# Patient Record
(demographics unavailable — no encounter records)

---

## 2025-06-18 NOTE — HISTORY OF PRESENT ILLNESS
[FreeTextEntry1] : 73 year old female with PMHx of HTN, HLD, DM, renal cell carcinoma dx >10 yrs ago s/p renal mass resection (follows with onc Dr. Wiley- remains on treatment per him), who presented to [facility] ED 6/13/25 s/p fall with head strike on her nightstand after dizziness episode during the night when getting up to use the bathroom. CTA showed chronic venous thrombosis, pt was started on aspirin per stroke team, and CTH showed 1.8 x 0.8cm small enhancing dural lesion along R parasagittal falx. Admitted to medicine due to persistent dizziness. MRI without contrast performed showing redemonstrated small extra-axial right posterior parafalcine lesion. Neurosurgery consulted during admission and recommended outpatient follow- up.   Presents TODAY for evaluation.  She endorses post fall left periorbital swelling and forehead hematoma have been improving.  She remains on aspirin daily per discharge instructions.  Denies headaches, other dizziness episodes, other new/worsening focal neurological deficits.  Of note- she had left ear pain on flight coming home from [place] and then a few days later noticed blood her in her left ear.   Oncologist:  Jorge Wiley [address] [place] [id number]   [phone] [email or web address]

## 2025-06-18 NOTE — PHYSICAL EXAM
[General Appearance - Alert] : alert [General Appearance - In No Acute Distress] : in no acute distress [General Appearance - Well-Appearing] : healthy appearing [Oriented To Time, Place, And Person] : oriented to person, place, and time [Impaired Insight] : insight and judgment were intact [Affect] : the affect was normal [Memory Recent] : recent memory was not impaired [Abnormal Walk] : normal gait [Sclera] : the sclera and conjunctiva were normal [Neck Appearance] : the appearance of the neck was normal [] : no respiratory distress [Respiration, Rhythm And Depth] : normal respiratory rhythm and effort [Skin Color & Pigmentation] : normal skin color and pigmentation [FreeTextEntry5] : CN II-XII grossly intact  [FreeTextEntry1] : Steady gait with cane assistive device

## 2025-06-18 NOTE — DATA REVIEWED
[de-identified] :   ACC: [id number] EXAM: CT ANGIO BRAIN (W)AW IC ORDERED BY: NOAN SOOD  ACC: [id number] EXAM: CT ANGIO NECK (W)AW IC ORDERED BY: NONA SOOD  ACC: [id number] EXAM: CT BRAIN ORDERED BY: NONA SOOD  PROCEDURE DATE: 06/13/2025    INTERPRETATION: CLINICAL INFORMATION: Fall, dizziness  COMPARISON: None.  CONTRAST: IV Contrast: Isovue 370 80 cc administered 0 cc discarded  TECHNIQUE:  CT BRAIN: Serial axial images were obtained from the skull base to the vertex without the use of contrast. Images are reformatted in sagittal and coronal planes.  CTA NECK/HEAD: After the intravenous power injection of non-ionic contrast material, serial thin sections were obtained through the cervical and intracranial circulation on a multislice CT scanner. Axial, Coronal and Sagittal MIP reformatted images were obtained. 3D reconstruction was also performed.  FINDINGS:  CT BRAIN:  There is no acute intra-axial or extra-axial hemorrhage. There is no mass effect or shift of the midline. The basal cisterns are not effaced. There is mild cerebral and cerebellar volume loss with prominence of the ventricles, sulci, and cerebellar folia. There are mild chronic ischemic changes in the cerebral white matter. There is no CT evidence of an acute vascular territorial infarct. There are atherosclerotic calcifications of the intracranial carotid arteries.  There is a left frontal scalp hematoma. The skull base and bony calvarium are intact. The visualized paranasal sinuses and tympanic/mastoid cavities are clear apart from minimal bilateral ethmoid and maxillary sinus mucosal thickening.   CTA HEAD/NECK:  There is a three-vessel aortic arch. The common carotid arteries are patent from the origins to the bifurcations. There is no significant atherosclerotic disease at the carotid bifurcations. The cervical internal carotid arteries are patent without stenosis based on NASCET criteria. The cervical vertebral arteries are patent from the origins to the skull base. The left vertebral artery is dominant. There is no evidence of cervical carotid or vertebral artery dissection or injury.  The skull base and intracranial carotid arteries are patent without significant stenosis. The proximal MCAs and ACAs are patent without significant stenosis. The anterior communicating artery is visualized. Distal KATE and MCA branches are grossly symmetric.  The intradural right vertebral artery terminates as a PICA. The intradural left vertebral artery and basilar artery are patent without significant stenosis. Dominant bilateral posterior communicating arteries supplying the PCAs. The proximal PCAs are patent without significant stenosis. The superior cerebellar artery origins, bilateral AICAs, and bilateral PICAs are identified.  There is no evidence of an intracranial arterial aneurysm on CTA.  There is overall poor enhancement of the superior sagittal sinus with trickle areas of flow and mild irregularity of the left transverse sinus and sigmoid sinus which do demonstrate flow. . There is a small enhancing extradural lesion along the right parasagittal falx posteriorly measuring 1.8 x 0.8 cm likely representing a meningioma.  The regional soft tissues of the neck are otherwise unremarkable apart from absence of the left thyroid lobe. The lung apices are clear. There are degenerative changes of the spine.  CAROTID STENOSIS REFERENCE: Percent (%) stenosis is expressed in terms of NASCET Criteria. (NASCET = 100x1-(N/D)). N=greatest narrowing. D=normal distal diameter - MILD = <50% stenosis. - MODERATE = 50-69% stenosis. - SEVERE = 70-89% stenosis. - HAIRLINE/CRITICAL = 90-99% stenosis. - OCCLUDED = 100% stenosis.   IMPRESSION:  CT HEAD: No acute intracranial hemorrhage, mass effect, or acute osseous fracture. Left frontal scalp hematoma.  Mild chronic ischemic changes in the cerebral white matter. No CT evidence of an acute vascular territorial infarct.  CTA NECK: No evidence of significant stenosis or occlusion. No evidence of cervical carotid or vertebral artery dissection or injury.  CTA HEAD: No large vessel occlusion or high-grade stenosis. No evidence of an intracranial arterial aneurysm.  Overall poor enhancement of the superior sagittal sinus with trickle areas of flow and mild irregularity of the left transverse sinus and sigmoid sinus which do demonstrate flow. Constellation of findings is concerning for chronic thrombosis with recanalization.  Small enhancing extradural lesion along the right parasagittal falx posteriorly measuring 1.8 x 0.8 cm likely representing a small meningioma.    --- End of Report --- [de-identified] : ACC: [id number] EXAM: MR BRAIN ORDERED BY: STEVEN SONI  PROCEDURE DATE: 06/14/2025    INTERPRETATION: CLINICAL INDICATION: Dizziness. Evaluate for stroke.  TECHNIQUE: Limited multi-sequential MR imaging of the brain was performed without intravenous contrast. Note, DWI, ADC and T2-FLAIR axial sequences through the whole brain were obtained only.  COMPARISON: CT head and CTA head/neck 6/13/2025  FINDINGS:  No acute infarction, intracranial hemorrhage or parenchymal mass. Few scattered T2 FLAIR hyperintense punctate foci in the periventricular white matter nonspecific but most likely occurring on the basis of mild chronic microvascular ischemic changes.  The ventricles are normal without evidence of hydrocephalus. There are no extra-axial fluid collections. Redemonstrated 0.8 x 1.8 cm extra-axial lesion along the falx right posterior aspect with homogeneous mild hyperintense signal FLAIR sequence.  The visualized intraorbital contents are unremarkable. The imaged portions of the paranasal sinuses are clear. The mastoid air cells are clear. The visualized soft tissues and osseous structures appear unremarkable.   IMPRESSION:  No acute infarct.  Redemonstrated small extra-axial right posterior parafalcine lesion, which is incompletely characterized but may represent a meningioma. Contrast-enhanced images are suggested for further evaluation.   --- End of Report ---

## 2025-06-18 NOTE — DATA REVIEWED
[de-identified] :   ACC: [id number] EXAM: CT ANGIO BRAIN (W)AW IC ORDERED BY: NONA SOOD  ACC: [id number] EXAM: CT ANGIO NECK (W)AW IC ORDERED BY: NONA SOOD  ACC: [id number] EXAM: CT BRAIN ORDERED BY: NONA SOOD  PROCEDURE DATE: 06/13/2025    INTERPRETATION: CLINICAL INFORMATION: Fall, dizziness  COMPARISON: None.  CONTRAST: IV Contrast: Isovue 370 80 cc administered 0 cc discarded  TECHNIQUE:  CT BRAIN: Serial axial images were obtained from the skull base to the vertex without the use of contrast. Images are reformatted in sagittal and coronal planes.  CTA NECK/HEAD: After the intravenous power injection of non-ionic contrast material, serial thin sections were obtained through the cervical and intracranial circulation on a multislice CT scanner. Axial, Coronal and Sagittal MIP reformatted images were obtained. 3D reconstruction was also performed.  FINDINGS:  CT BRAIN:  There is no acute intra-axial or extra-axial hemorrhage. There is no mass effect or shift of the midline. The basal cisterns are not effaced. There is mild cerebral and cerebellar volume loss with prominence of the ventricles, sulci, and cerebellar folia. There are mild chronic ischemic changes in the cerebral white matter. There is no CT evidence of an acute vascular territorial infarct. There are atherosclerotic calcifications of the intracranial carotid arteries.  There is a left frontal scalp hematoma. The skull base and bony calvarium are intact. The visualized paranasal sinuses and tympanic/mastoid cavities are clear apart from minimal bilateral ethmoid and maxillary sinus mucosal thickening.   CTA HEAD/NECK:  There is a three-vessel aortic arch. The common carotid arteries are patent from the origins to the bifurcations. There is no significant atherosclerotic disease at the carotid bifurcations. The cervical internal carotid arteries are patent without stenosis based on NASCET criteria. The cervical vertebral arteries are patent from the origins to the skull base. The left vertebral artery is dominant. There is no evidence of cervical carotid or vertebral artery dissection or injury.  The skull base and intracranial carotid arteries are patent without significant stenosis. The proximal MCAs and ACAs are patent without significant stenosis. The anterior communicating artery is visualized. Distal KATE and MCA branches are grossly symmetric.  The intradural right vertebral artery terminates as a PICA. The intradural left vertebral artery and basilar artery are patent without significant stenosis. Dominant bilateral posterior communicating arteries supplying the PCAs. The proximal PCAs are patent without significant stenosis. The superior cerebellar artery origins, bilateral AICAs, and bilateral PICAs are identified.  There is no evidence of an intracranial arterial aneurysm on CTA.  There is overall poor enhancement of the superior sagittal sinus with trickle areas of flow and mild irregularity of the left transverse sinus and sigmoid sinus which do demonstrate flow. . There is a small enhancing extradural lesion along the right parasagittal falx posteriorly measuring 1.8 x 0.8 cm likely representing a meningioma.  The regional soft tissues of the neck are otherwise unremarkable apart from absence of the left thyroid lobe. The lung apices are clear. There are degenerative changes of the spine.  CAROTID STENOSIS REFERENCE: Percent (%) stenosis is expressed in terms of NASCET Criteria. (NASCET = 100x1-(N/D)). N=greatest narrowing. D=normal distal diameter - MILD = <50% stenosis. - MODERATE = 50-69% stenosis. - SEVERE = 70-89% stenosis. - HAIRLINE/CRITICAL = 90-99% stenosis. - OCCLUDED = 100% stenosis.   IMPRESSION:  CT HEAD: No acute intracranial hemorrhage, mass effect, or acute osseous fracture. Left frontal scalp hematoma.  Mild chronic ischemic changes in the cerebral white matter. No CT evidence of an acute vascular territorial infarct.  CTA NECK: No evidence of significant stenosis or occlusion. No evidence of cervical carotid or vertebral artery dissection or injury.  CTA HEAD: No large vessel occlusion or high-grade stenosis. No evidence of an intracranial arterial aneurysm.  Overall poor enhancement of the superior sagittal sinus with trickle areas of flow and mild irregularity of the left transverse sinus and sigmoid sinus which do demonstrate flow. Constellation of findings is concerning for chronic thrombosis with recanalization.  Small enhancing extradural lesion along the right parasagittal falx posteriorly measuring 1.8 x 0.8 cm likely representing a small meningioma.    --- End of Report --- [de-identified] : ACC: [id number] EXAM: MR BRAIN ORDERED BY: STEVEN SONI  PROCEDURE DATE: 06/14/2025    INTERPRETATION: CLINICAL INDICATION: Dizziness. Evaluate for stroke.  TECHNIQUE: Limited multi-sequential MR imaging of the brain was performed without intravenous contrast. Note, DWI, ADC and T2-FLAIR axial sequences through the whole brain were obtained only.  COMPARISON: CT head and CTA head/neck 6/13/2025  FINDINGS:  No acute infarction, intracranial hemorrhage or parenchymal mass. Few scattered T2 FLAIR hyperintense punctate foci in the periventricular white matter nonspecific but most likely occurring on the basis of mild chronic microvascular ischemic changes.  The ventricles are normal without evidence of hydrocephalus. There are no extra-axial fluid collections. Redemonstrated 0.8 x 1.8 cm extra-axial lesion along the falx right posterior aspect with homogeneous mild hyperintense signal FLAIR sequence.  The visualized intraorbital contents are unremarkable. The imaged portions of the paranasal sinuses are clear. The mastoid air cells are clear. The visualized soft tissues and osseous structures appear unremarkable.   IMPRESSION:  No acute infarct.  Redemonstrated small extra-axial right posterior parafalcine lesion, which is incompletely characterized but may represent a meningioma. Contrast-enhanced images are suggested for further evaluation.   --- End of Report ---

## 2025-06-18 NOTE — ASSESSMENT
[FreeTextEntry1] : This 73-year-old female presented with an incidental finding of a 1.8 x 0.8 cm enhancing dural-based lesion along the right parasagittal falx, discovered during recent hospitalization for dizziness and a fall.  She had a forehead hematoma from the fall.  CTA showed chronic venous thrombosis.  She is on aspirin for stroke prevention. She also has a history of a ruptured eardrum, hypertension, hypercholesterolemia, diabetes, and renal issues. Will obtain MRI w/wo contrast and refer to neuro/ent for evaluation of venous occlusion and eardrum/vertigo issues.   Dr. D'Amico independently reviewed all available images with patient.   PLAN: - MRI brain w/wo contrast  - Referral to stroke neurology NP Jenelle  - Referral to ENT Dr. Maurice Teixeira - RTC after MRI for review   Patient verbalizes understanding of today's discussion and next steps in treatment plan.   Today, my ACP, Lori Reid, was here to observe my evaluation and management services for this patient to be followed going forward.         Patient appreciates and agrees with current plan. This note was generated using medical dictation software. A reasonable effort has been made for proofreading its contents, but typos may still remain. If there are any questions or points of clarification needed, please notify my office.  A total of 45 minutes was spent reviewing the labs, imaging and physical examination of the patient. We discussed the diagnosis, and the plan. The patient's questions were answered. The patient demonstrated an excellent understanding of the plan.   .

## 2025-06-24 NOTE — PHYSICAL EXAM
[TextEntry] : General: AAO, no significant distress Psychiatric: affect pleasant and within normal limits Eyes: relatively symmetric, no obvious nystagmus Skin: no significant lesions on face Nose: no significant lesions; patent. Oral Cavity & Oropharynx: no significant deformity or lesions Neck/Lymphatics: no significant masses or abnormal cervical nodes palpated Respiratory: breathing comfortably; no significant distress Neurologic: cranial nerves II-XII grossly intact; EOMI Facial function: symmetric   Ear examination performed under binocular otologic microscope: Left Ear External: Pinna and periauricular area is normal. Canal: Ear canal skin is not inflamed or edematous. Left cerumen impaction cleaned under microscopy with instrumentation Tympanic Membrane: Intact and in good position.   Right Ear External: Pinna and periauricular area is normal. Canal: Ear canal skin is not inflamed or edematous. Right cerumen impaction cleaned under microscopy with instrumentation Tympanic Membrane: Intact and in good position.   Procedure: Left cerumen removal Pre-operative Diagnosis: Left cerumen impaction Post-operative Diagnosis: Left cerumen impaction Anesthesia: None Procedure Details: The patient was placed in the supine position. The left ear canal was determined to be impacted with cerumen. A curette and/or suction was used to remove the cerumen impaction under microscopy. Condition: Stable. Patient tolerated procedure well. Complications: None.   Procedure: Right cerumen removal Pre-operative Diagnosis: Right cerumen impaction Post-operative Diagnosis: Right cerumen impaction Anesthesia: None Procedure Details: The patient was placed in the supine position. The right ear canal was determined to be impacted with cerumen. A curette and/or suction was used to remove the cerumen impaction under microscopy. Condition: Stable. Patient tolerated procedure well. Complications: None.   Nasal Endoscopy:   Pre-operative Diagnosis: post-nasal drip Post-operative Diagnosis: post-nasal drip, bilateral inferior turbinate hypertrophy Anesthesia: Topical or None Procedure: Bilateral nasal endoscopy Procedure Details:   The patient was placed in the seated position sitting straight up. The telescope was passed along the left nasal floor to the nasopharynx. It was then passed into the region of the middle meatus, middle turbinate, and the sphenoethmoid region. An identical procedure was performed on the right side.   Findings: Interior nasal cavity: normal bilaterally Middle and superior meatus: normal bilaterally Sphenoethmoidal recess: normal bilaterally Mucosa: normal bilaterally Nasal septum: normal Discharge: none bilaterally Turbinates: bilateral inferior turbinate hypertrophy Adenoid: normal bilaterally Posterior choanae: normal bilaterally Eustachian tubes: normal bilaterally Mucous stranding: normal bilaterally Lesions: Not present   Comments: secretions in choana   Condition: Stable. Patient tolerated procedure well.

## 2025-06-24 NOTE — HISTORY OF PRESENT ILLNESS
[de-identified] : 73F who presents for a TM evaluation. She was on a flight last year and felt a lot of left ear pain due to the pressure. She has a chronic history of post-nasal drip. She also endorses vertigo when she lays flat. She will spin for 15-30 seconds. No otalgia, otorrhea, vertigo, tinnitus, acute hearing changes. No hx of ear surgery nor ear infections.

## 2025-06-24 NOTE — ASSESSMENT
[FreeTextEntry1] : Bilateral Cerumen Impaction - 1 chronic illness - Bilateral cerumen impaction removed under microscopy with instrumentation  Post-nasal Drip / Bilateral inferior turbinate hypertrophy - 2 chronic illnesses - Ipratropium Bromide 1 spray to each nostril bid - discussed the risks of Ipratropium bromide spray which include but are not limited to: epistaxis, dry nose, pharyngitis, nasal irritation/pain, headache, sore throat, congestion, sinusitis, rhinorrhea  Right BPPV The patient has benign paroxysmal positional vertigo of the posterior canal on the RIGHT -We reviewed the anatomy and etiology of the disease with the patient. -Epley maneuver was performed for the affected side in clinic today. -BPPV information sheet given to patient. -We have recommended that the patient sleep upright for 2 nights. - John Caro as needed - f/u as needed

## 2025-06-25 NOTE — ASSESSMENT
[FreeTextEntry1] : Deysi Black is a 73 year old female with PMH of HTN, HLD, T2DM c/b neuropathy, renal cell carcinoma s/p L partial nephrectomy and meningioma who presented to [facility] ED with dizziness and fall thought due to BPPV with incidental finding of CVST now presents for initial neurological consultation. Chronic CVST of unclear etiology, possibly due to previous head trauma vs. hypercoagulability from kidney CA.   Plan: -Continue Aspirin 81 mg daily for secondary stroke prevention  -Repeat MRV in 6 months for CVST surveillance if stable, then repeat yearly -Hematology referral to r/o underlying hypercoagulable disorder  -Referral to [place] for vertigo management if symptoms continue  -Continue aggressive vascular risk factor control with PCP/Cardiology -Encouraged healthy lifestyle habits including regular exercise, goal of 8 hours of sleep, adequate hydration and stress management -Counselled on signs of stroke BEFAST and to call 911 with any new or worsening neurological symptoms -RTO in 6 months after repeat imaging

## 2025-06-25 NOTE — PHYSICAL EXAM
[FreeTextEntry1] : Alert. Fully oriented. Speech and language are intact. Cranial nerves II-XII are intact. No nystagmus noted. Motor exam reveals intact strength with individual muscle testing in bilateral upper and lower extremities, RLE limited due to recent fall. No drift. Mildly antalgic gait with cane.

## 2025-06-25 NOTE — HISTORY OF PRESENT ILLNESS
[FreeTextEntry1] : Deysi Black is a 73 year old female with PMH of HTN, HLD, T2DM c/b neuropathy, renal cell carcinoma s/p L partial nephrectomy and meningioma who presented to [facility] ED with dizziness and fall thought due to BPPV with incidental finding of CVST now presents for initial neurological consultation.  [facility] Hospitalization 6/13/25-6/15/25: HCT: No acute intracranial hemorrhage, mass effect or acute osseous fracture. Left frontal scalp hematoma. Mild chronic changes in the cerebral white matter.  CTA H/N: No significant stenosis, LVO or aneurysm. Overall poor enhancement of the superior sagittal sinus with trickle areas of flow and mild irregularity of the left transverse sinus and sigmoid sinus which do demonstrate flow, concerning for chronic thrombosis with recanalization. Small enhancing extradural lesion along the right parasagittal flax posteriorly measuring 1.8 x 0.8 cm likely representing a small meningioma.  MRI: No acute infarct. Redemonstrated small extra-axial right posterior parafalcine lesion, which is incompletely characterized but may represent a meningioma.   Since discharge, patient reports improvement in her dizziness since seeing ENT, however she continues to report mild dizziness with position changes. She reports no stroke-like symptoms including visual changes, speech disturbances, weakness, numbness/tingling, etc. She reports a history of an elevator accident in which she hit both the front and back of her head, resulting in a lumbar fracture without LOC around 2015. She reports no other cancer side aside from incidental finding of a mass on her left kidney which was surgically removed and did not warrant chemotherapy. She reports no history of hormonal medications and s/p hysterectomy due to a large fibroid. She reports no known history of blood clots. She reports intermittent right sided headaches triggered by caffeine or stress. She reports compliance with her Aspirin 81 mg daily. She is very active and loves to travel.

## 2025-06-25 NOTE — ASSESSMENT
[FreeTextEntry1] : Deysi Black is a 73 year old female with PMH of HTN, HLD, T2DM c/b neuropathy, renal cell carcinoma s/p L partial nephrectomy and meningioma who presented to [facility] ED with dizziness and fall thought due to BPPV with incidental finding of CVST now presents for initial neurological consultation. Chronic CVST of unclear etiology, possibly due to previous head trauma vs. hypercoagulability from kidney CA.   Plan: -Continue Aspirin 81 mg daily for secondary stroke prevention  -Repeat MRV in 6 months for CVST surveillance if stable, then repeat yearly -Hematology referral to r/o underlying hypercoagulable disorder  -Referral to [place] for vertigo management if symptoms continue  -Continue aggressive vascular risk factor control with PCP/Cardiology -Encouraged healthy lifestyle habits including regular exercise, goal of 8 hours of sleep, adequate hydration and stress management -Counselled on signs of stroke BEFAST and to call 911 with any new or worsening neurological symptoms -RTO in 6 months after repeat imaging

## 2025-06-25 NOTE — HISTORY OF PRESENT ILLNESS
[FreeTextEntry1] : Deysi Black is a 73 year old female with PMH of HTN, HLD, T2DM c/b neuropathy, renal cell carcinoma s/p L partial nephrectomy and meningioma who presented to [facility] ED with dizziness and fall thought due to BPPV with incidental finding of CVST now presents for initial neurological consultation.  [facility] Hospitalization 6/13/25-6/15/25: HCT: No acute intracranial hemorrhage, mass effect or acute osseous fracture. Left frontal scalp hematoma. Mild chronic changes in the cerebral white matter.  CTA H/N: No significant stenosis, LVO or aneurysm. Overall poor enhancement of the superior sagittal sinus with trickle areas of flow and mild irregularity of the left transverse sinus and sigmoid sinus which do demonstrate flow, concerning for chronic thrombosis with recanalization. Small enhancing extradural lesion along the right parasagittal flax posteriorly measuring 1.8 x 0.8 cm likely representing a small meningioma.  MRI: No acute infarct. Redemonstrated small extra-axial right posterior parafalcine lesion, which is incompletely characterized but may represent a meningioma.   Since discharge, patient reports improvement in her dizziness since seeing ENT, however she continues to report mild dizziness with position changes. She reports no stroke-like symptoms including visual changes, speech disturbances, weakness, numbness/tingling, etc. She reports a history of an elevator accident in which she hit both the front and back of her head, resulting in a lumbar fracture without LOC around 2015. She reports no other cancer side aside from incidental finding of a mass on her left kidney which was surgically removed and did not warrant chemotherapy. She reports no history of hormonal medications and s/p hysterectomy due to a large fibroid. She reports no known history of blood clots. She reports intermittent right sided headaches triggered by caffeine or stress. She reports compliance with her Aspirin 81 mg daily. She is very active and loves to travel.

## 2025-07-16 NOTE — HISTORY OF PRESENT ILLNESS
[FreeTextEntry1] : 73 year old female with PMHx of HTN, HLD, DM, renal cell carcinoma dx >10 yrs ago s/p renal mass resection (follows with onc Dr. Wiley- remains on treatment per him), who presented to [facility] ED 6/13/25 s/p fall with head strike on her nightstand after dizziness episode during the night when getting up to use the bathroom. CTA showed chronic venous thrombosis, pt was started on aspirin per stroke team, and CTH showed 1.8 x 0.8cm small enhancing dural lesion along R parasagittal falx. Admitted to medicine due to persistent dizziness. MRI without contrast performed showing redemonstrated small extra-axial right posterior parafalcine lesion. Neurosurgery consulted during admission and recommended outpatient follow- up.   6/18/25 pt presented in clinic for evaluation. Post fall left periorbital swelling and forehead hematoma have been improving. She remains on aspirin daily per discharge instructions.  Denies headaches, other dizziness episodes, other new/worsening focal neurological deficits.  Of note- she had left ear pain on flight coming home from [place] and then a few days later noticed blood her in her left ear.  Plan was made to obtain MRI brain w/wo and refer to stroke neurology/ENT.  7/3/25 MRI Brain w/wo: 2.1 cm enhancing extra-axial mass along the right posterior parasagittal falx consistent with a meningioma.  The lesion appears to extend towards the distal aspect of the superior sagittal sinus. Evaluation for invasion the sinuses limited as the lesion demonstrates similar enhancement pattern as the superior sagittal sinus. This can be further evaluated with MRI or CT venogram as clinically warranted.  Returns TODAY for follow- up and MRI review.  She reports persistent dizziness but denies any new/worsening focal neuro deficits.  Oncologist:  Jorge Wiley [address] [place] [id number]   [phone] [email or web address]

## 2025-07-16 NOTE — REASON FOR VISIT
[Follow-Up: _____] : a [unfilled] follow-up visit [FreeTextEntry1] : right posterior parafalcine lesion. Follow- up and MRI review

## 2025-07-16 NOTE — PHYSICAL EXAM
[General Appearance - Alert] : alert [General Appearance - In No Acute Distress] : in no acute distress [General Appearance - Well Nourished] : well nourished [Oriented To Time, Place, And Person] : oriented to person, place, and time [Impaired Insight] : insight and judgment were intact [Affect] : the affect was normal [Memory Recent] : recent memory was not impaired [Abnormal Walk] : normal gait [Limited Balance] : the patient's balance was impaired

## 2025-07-16 NOTE — HISTORY OF PRESENT ILLNESS
[FreeTextEntry1] : 73 year old female with PMHx of HTN, HLD, DM, renal cell carcinoma dx >10 yrs ago s/p renal mass resection (follows with onc Dr. Wiley- remains on treatment per him), who presented to [facility] ED 6/13/25 s/p fall with head strike on her nightstand after dizziness episode during the night when getting up to use the bathroom. CTA showed chronic venous thrombosis, pt was started on aspirin per stroke team, and CTH showed 1.8 x 0.8cm small enhancing dural lesion along R parasagittal falx. Admitted to medicine due to persistent dizziness. MRI without contrast performed showing redemonstrated small extra-axial right posterior parafalcine lesion. Neurosurgery consulted during admission and recommended outpatient follow- up.   6/18/25 pt presented in clinic for evaluation. Post fall left periorbital swelling and forehead hematoma have been improving. She remains on aspirin daily per discharge instructions.  Denies headaches, other dizziness episodes, other new/worsening focal neurological deficits.  Of note- she had left ear pain on flight coming home from [place] and then a few days later noticed blood her in her left ear.  Plan was made to obtain MRI brain w/wo and refer to stroke neurology/ENT.  7/3/25 MRI Brain w/wo: 2.1 cm enhancing extra-axial mass along the right posterior parasagittal falx consistent with a meningioma.  The lesion appears to extend towards the distal aspect of the superior sagittal sinus. Evaluation for invasion the sinuses limited as the lesion demonstrates similar enhancement pattern as the superior sagittal sinus. This can be further evaluated with MRI or CT venogram as clinically warranted.  Returns TODAY for follow- up and MRI review.  She reports persistent dizziness but denies any new/worsening focal neuro deficits.  Oncologist:  Jorge Wiley [address] [place] [id number]   [phone] [email or web address]

## 2025-07-16 NOTE — DATA REVIEWED
[de-identified] : brain w/wo on 7/3/25 in PACS   	 EXAM: [id number] - MR BRAIN WAW IC  - ORDERED BY: PALMA WOLFE   PROCEDURE DATE:  07/03/2025    INTERPRETATION:  CLINICAL INDICATION: Brain mass on MRI without contrast. With contrast for further workup.  TECHNIQUE: Multi-planar multi-sequential MR imaging of the brain was performed before and after the intravenous administration of contrast.  IV Contrast: Gadavist  10 cc administered 0 cc discarded  COMPARISON: MRI brain 6/14/2025. CT head 6/13/2025.  FINDINGS: There is a 2.1 cm transverse by 0.9 cm AP by 2.1 cm craniocaudal T1/T2 isointense homogenously enhancing extra-axial mass along the right posterior parasagittal falx with dural tail (10:102, 1000:105). The lesion appears to extend towards the distal aspect of the superior sagittal sinus (for example series 1001 image 204). Evaluation for invasion the sinuses limited as the lesion demonstrates similar enhancement pattern as the superior sagittal sinus.  There is diffuse thickening of the falx stable from prior exam.  No acute infarction or intracranial hemorrhage. Scattered T2/FLAIR hyperintense foci within the subcortical white matter that are likely sequela of mild chronic microvascular ischemic disease. No abnormal intracranial enhancement is otherwise identified.  The ventricles are normal without evidence of hydrocephalus. There are no extra-axial fluid collections. The dominant arterial skull base flow voids are present.  The visualized intraorbital contents are normal. The imaged portions of the paranasal sinuses are clear. The mastoid air cells are clear. The visualized soft tissues and osseous structures appear normal.  IMPRESSION:  2.1 cm enhancing extra-axial mass along the right posterior parasagittal falx consistent with a meningioma.  The lesion appears to extend towards the distal aspect of the superior sagittal sinus. Evaluation for invasion the sinuses limited as the lesion demonstrates similar enhancement pattern as the superior sagittal sinus. This can be further evaluated with MRI or CT venogram as clinically warranted.  --- End of Report ---      ILAN JENKINS MD; Resident Radiologist This document has been electronically signed. ORION DELACRUZ MD; Attending Radiologist This document has been electronically signed. Jul 7 2025  3:10PM
[de-identified] : brain w/wo on 7/3/25 in PACS   	 EXAM: [id number] - MR BRAIN WAW IC  - ORDERED BY: PALMA WOLFE   PROCEDURE DATE:  07/03/2025    INTERPRETATION:  CLINICAL INDICATION: Brain mass on MRI without contrast. With contrast for further workup.  TECHNIQUE: Multi-planar multi-sequential MR imaging of the brain was performed before and after the intravenous administration of contrast.  IV Contrast: Gadavist  10 cc administered 0 cc discarded  COMPARISON: MRI brain 6/14/2025. CT head 6/13/2025.  FINDINGS: There is a 2.1 cm transverse by 0.9 cm AP by 2.1 cm craniocaudal T1/T2 isointense homogenously enhancing extra-axial mass along the right posterior parasagittal falx with dural tail (10:102, 1000:105). The lesion appears to extend towards the distal aspect of the superior sagittal sinus (for example series 1001 image 204). Evaluation for invasion the sinuses limited as the lesion demonstrates similar enhancement pattern as the superior sagittal sinus.  There is diffuse thickening of the falx stable from prior exam.  No acute infarction or intracranial hemorrhage. Scattered T2/FLAIR hyperintense foci within the subcortical white matter that are likely sequela of mild chronic microvascular ischemic disease. No abnormal intracranial enhancement is otherwise identified.  The ventricles are normal without evidence of hydrocephalus. There are no extra-axial fluid collections. The dominant arterial skull base flow voids are present.  The visualized intraorbital contents are normal. The imaged portions of the paranasal sinuses are clear. The mastoid air cells are clear. The visualized soft tissues and osseous structures appear normal.  IMPRESSION:  2.1 cm enhancing extra-axial mass along the right posterior parasagittal falx consistent with a meningioma.  The lesion appears to extend towards the distal aspect of the superior sagittal sinus. Evaluation for invasion the sinuses limited as the lesion demonstrates similar enhancement pattern as the superior sagittal sinus. This can be further evaluated with MRI or CT venogram as clinically warranted.  --- End of Report ---      ILAN JENKINS MD; Resident Radiologist This document has been electronically signed. ORION DELACRUZ MD; Attending Radiologist This document has been electronically signed. Jul 7 2025  3:10PM
[de-identified] : brain w/wo on 7/3/25 in PACS   	 EXAM: [id number] - MR BRAIN WAW IC  - ORDERED BY: PALMA WOLFE   PROCEDURE DATE:  07/03/2025    INTERPRETATION:  CLINICAL INDICATION: Brain mass on MRI without contrast. With contrast for further workup.  TECHNIQUE: Multi-planar multi-sequential MR imaging of the brain was performed before and after the intravenous administration of contrast.  IV Contrast: Gadavist  10 cc administered 0 cc discarded  COMPARISON: MRI brain 6/14/2025. CT head 6/13/2025.  FINDINGS: There is a 2.1 cm transverse by 0.9 cm AP by 2.1 cm craniocaudal T1/T2 isointense homogenously enhancing extra-axial mass along the right posterior parasagittal falx with dural tail (10:102, 1000:105). The lesion appears to extend towards the distal aspect of the superior sagittal sinus (for example series 1001 image 204). Evaluation for invasion the sinuses limited as the lesion demonstrates similar enhancement pattern as the superior sagittal sinus.  There is diffuse thickening of the falx stable from prior exam.  No acute infarction or intracranial hemorrhage. Scattered T2/FLAIR hyperintense foci within the subcortical white matter that are likely sequela of mild chronic microvascular ischemic disease. No abnormal intracranial enhancement is otherwise identified.  The ventricles are normal without evidence of hydrocephalus. There are no extra-axial fluid collections. The dominant arterial skull base flow voids are present.  The visualized intraorbital contents are normal. The imaged portions of the paranasal sinuses are clear. The mastoid air cells are clear. The visualized soft tissues and osseous structures appear normal.  IMPRESSION:  2.1 cm enhancing extra-axial mass along the right posterior parasagittal falx consistent with a meningioma.  The lesion appears to extend towards the distal aspect of the superior sagittal sinus. Evaluation for invasion the sinuses limited as the lesion demonstrates similar enhancement pattern as the superior sagittal sinus. This can be further evaluated with MRI or CT venogram as clinically warranted.  --- End of Report ---      ILAN JENKINS MD; Resident Radiologist This document has been electronically signed. ORION DELACRUZ MD; Attending Radiologist This document has been electronically signed. Jul 7 2025  3:10PM
Alert and oriented to person, place and time, memory intact, behavior appropriate to situation, PERRL.

## 2025-07-16 NOTE — HISTORY OF PRESENT ILLNESS
[FreeTextEntry1] : 73 year old female with PMHx of HTN, HLD, DM, renal cell carcinoma dx >10 yrs ago s/p renal mass resection (follows with onc Dr. Wiley- remains on treatment per him), who presented to [facility] ED 6/13/25 s/p fall with head strike on her nightstand after dizziness episode during the night when getting up to use the bathroom. CTA showed chronic venous thrombosis, pt was started on aspirin per stroke team, and CTH showed 1.8 x 0.8cm small enhancing dural lesion along R parasagittal falx. Admitted to medicine due to persistent dizziness. MRI without contrast performed showing redemonstrated small extra-axial right posterior parafalcine lesion. Neurosurgery consulted during admission and recommended outpatient follow- up.   6/18/25 pt presented in clinic for evaluation. Post fall left periorbital swelling and forehead hematoma have been improving. She remains on aspirin daily per discharge instructions.  Denies headaches, other dizziness episodes, other new/worsening focal neurological deficits.  Of note- she had left ear pain on flight coming home from [place] and then a few days later noticed blood her in her left ear.  Plan was made to obtain MRI brain w/wo and refer to stroke neurology/ENT.  7/3/25 MRI Brain w/wo: 2.1 cm enhancing extra-axial mass along the right posterior parasagittal falx consistent with a meningioma.  The lesion appears to extend towards the distal aspect of the superior sagittal sinus. Evaluation for invasion the sinuses limited as the lesion demonstrates similar enhancement pattern as the superior sagittal sinus. This can be further evaluated with MRI or CT venogram as clinically warranted.  Returns TODAY for follow- up and MRI review.  She reports persistent dizziness but denies any new/worsening focal neuro deficits.  Oncologist:  Jorge Wiley [address] [place] [id number]   [phone] [email or web address]

## 2025-07-16 NOTE — ASSESSMENT
[FreeTextEntry1] : This 73-year-old patient presented with an incidentally found meningioma after a fall. A non-contrast scan from four weeks prior and the current contrast-enhanced study are available. The lesion appears larger on visual inspection but comparing contrast-enhanced to non-contrast imaging. The patient also has a venous thrombosis in the superior sinus, incidentally noted on imaging, and a history of renal issues. The plan is to obtain another MRI in four weeks to establish a new baseline and better assess growth, monitor the venous thrombosis during follow-up imaging, consider anticoagulation, and consider renal function tests before future contrast-enhanced studies.   PLAN  -- CT C/A/P to r/o malignancy - repeat MRI brain w/wo in 6 months to reevaluate for changes (questionnable growth compared to previous non cont MRI) - f/u after images to review  I, Dr. Randy D'Amico, personally performed the evaluation and management (E/M) services for this established patient who presents today with (a) new problem(s)/exacerbation of (an) existing condition(s). That E/M includes conducting the clinically appropriate interval history &/or exam, assessing all new/exacerbated conditions, and establishing a new plan of care. Today, my FABY, Hyunchu Opal-Gold, was here to observe my evaluation and management service for this new problem/exacerbated condition and follow the plan of care established by me going forward.      Patient appreciates and agrees with current plan. This note was generated using medical dictation software. A reasonable effort has been made for proofreading its contents, but typos may still remain. If there are any questions or points of clarification needed, please notify my office.   A total of 25 minutes was spent reviewing the labs, imaging and physical examination of the patient. We discussed the diagnosis, and the plan. The patient's questions were answered. The patient demonstrated an excellent understanding of the plan.